# Patient Record
Sex: MALE | Race: WHITE | NOT HISPANIC OR LATINO | Employment: UNEMPLOYED | URBAN - METROPOLITAN AREA
[De-identification: names, ages, dates, MRNs, and addresses within clinical notes are randomized per-mention and may not be internally consistent; named-entity substitution may affect disease eponyms.]

---

## 2019-08-06 ENCOUNTER — OFFICE VISIT (OUTPATIENT)
Dept: ORTHOPEDIC SURGERY | Facility: CLINIC | Age: 29
End: 2019-08-06

## 2019-08-06 VITALS — HEART RATE: 69 BPM | WEIGHT: 249.34 LBS | OXYGEN SATURATION: 98 % | HEIGHT: 69 IN | BODY MASS INDEX: 36.93 KG/M2

## 2019-08-06 DIAGNOSIS — S42.001A CLOSED DISPLACED FRACTURE OF RIGHT CLAVICLE, UNSPECIFIED PART OF CLAVICLE, INITIAL ENCOUNTER: Primary | ICD-10-CM

## 2019-08-06 PROCEDURE — 99203 OFFICE O/P NEW LOW 30 MIN: CPT | Performed by: PHYSICIAN ASSISTANT

## 2019-08-06 RX ORDER — OMEPRAZOLE 40 MG/1
40 CAPSULE, DELAYED RELEASE ORAL DAILY
COMMUNITY

## 2019-08-06 RX ORDER — LISINOPRIL 20 MG/1
20 TABLET ORAL DAILY
COMMUNITY

## 2019-08-06 RX ORDER — IBUPROFEN 200 MG
200 TABLET ORAL EVERY 6 HOURS PRN
COMMUNITY
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
## 2023-06-14 ENCOUNTER — OFFICE VISIT (OUTPATIENT)
Dept: CARDIOLOGY | Facility: CLINIC | Age: 33
End: 2023-06-14
Payer: COMMERCIAL

## 2023-06-14 VITALS
HEIGHT: 68 IN | OXYGEN SATURATION: 96 % | HEART RATE: 80 BPM | BODY MASS INDEX: 40.77 KG/M2 | WEIGHT: 269 LBS | SYSTOLIC BLOOD PRESSURE: 132 MMHG | DIASTOLIC BLOOD PRESSURE: 70 MMHG

## 2023-06-14 DIAGNOSIS — G47.33 OSA (OBSTRUCTIVE SLEEP APNEA): Primary | ICD-10-CM

## 2023-06-14 DIAGNOSIS — I10 PRIMARY HYPERTENSION: ICD-10-CM

## 2023-06-14 DIAGNOSIS — G47.10 HYPERSOMNIA, UNSPECIFIED: ICD-10-CM

## 2023-06-14 RX ORDER — LOSARTAN POTASSIUM 50 MG/1
50 TABLET ORAL
COMMUNITY

## 2023-06-14 NOTE — ASSESSMENT & PLAN NOTE
He has been told that he had sleep apnea in the past.  His last home sleep study was about 3 years ago.  He has tried to use CPAP therapy but he was pulling the mask off for unknown reasons.  He returned the PAP device to the [facility] and does not have a Pap now.    Plan Home sleep test for reevaluation so that we can restart treatment.    He reports that he is very willing to restart PAP therapy if his home sleep test is positive.

## 2023-06-14 NOTE — PATIENT INSTRUCTIONS
Sleep Apnea  Sleep apnea is a condition in which breathing pauses or becomes shallow during sleep. People with sleep apnea usually snore loudly. They may have times when they gasp and stop breathing for 10 seconds or more during sleep. This may happen many times during the night.  Sleep apnea disrupts your sleep and keeps your body from getting the rest that it needs. This condition can increase your risk of certain health problems, including:  Heart attack.  Stroke.  Obesity.  Type 2 diabetes.  Heart failure.  Irregular heartbeat.  High blood pressure.  The goal of treatment is to help you breathe normally again.  What are the causes?  A normal airway compared to a blocked airway.      The most common cause of sleep apnea is a collapsed or blocked airway.  There are three kinds of sleep apnea:  Obstructive sleep apnea. This kind is caused by a blocked or collapsed airway.  Central sleep apnea. This kind happens when the part of the brain that controls breathing does not send the correct signals to the muscles that control breathing.  Mixed sleep apnea. This is a combination of obstructive and central sleep apnea.  What increases the risk?  You are more likely to develop this condition if you:  Are overweight.  Smoke.  Have a smaller than normal airway.  Are older.  Are male.  Drink alcohol.  Take sedatives or tranquilizers.  Have a family history of sleep apnea.  Have a tongue or tonsils that are larger than normal.  What are the signs or symptoms?  Symptoms of this condition include:  Trouble staying asleep.  Loud snoring.  Morning headaches.  Waking up gasping.  Dry mouth or sore throat in the morning.  Daytime sleepiness and tiredness.  If you have daytime fatigue because of sleep apnea, you may be more likely to have:  Trouble concentrating.  Forgetfulness.  Irritability or mood swings.  Personality changes.  Feelings of depression.  Sexual dysfunction. This may include loss of interest if you are female, or  erectile dysfunction if you are male.  How is this diagnosed?  This condition may be diagnosed with:  A medical history.  A physical exam.  A series of tests that are done while you are sleeping (sleep study). These tests are usually done in a sleep lab, but they may also be done at home.  How is this treated?  A person using a CPAP device.      Treatment for this condition aims to restore normal breathing and to ease symptoms during sleep. It may involve managing health issues that can affect breathing, such as high blood pressure or obesity. Treatment may include:  Sleeping on your side.  Using a decongestant if you have nasal congestion.  Avoiding the use of depressants, including alcohol, sedatives, and narcotics.  Losing weight if you are overweight.  Making changes to your diet.  Quitting smoking.  Using a device to open your airway while you sleep, such as:  An oral appliance. This is a custom-made mouthpiece that shifts your lower jaw forward.  A continuous positive airway pressure (CPAP) device. This device blows air through a mask when you breathe out (exhale).  A nasal expiratory positive airway pressure (EPAP) device. This device has valves that you put into each nostril.  A bi-level positive airway pressure (BIPAP) device. This device blows air through a mask when you breathe in (inhale) and breathe out (exhale).  Having surgery if other treatments do not work. During surgery, excess tissue is removed to create a wider airway.  Follow these instructions at home:  Lifestyle  Make any lifestyle changes that your health care provider recommends.  Eat a healthy, well-balanced diet.  Take steps to lose weight if you are overweight.  Avoid using depressants, including alcohol, sedatives, and narcotics.  Do not use any products that contain nicotine or tobacco. These products include cigarettes, chewing tobacco, and vaping devices, such as e-cigarettes. If you need help quitting, ask your health care  provider.  General instructions  Take over-the-counter and prescription medicines only as told by your health care provider.  If you were given a device to open your airway while you sleep, use it only as told by your health care provider.  If you are having surgery, make sure to tell your health care provider you have sleep apnea. You may need to bring your device with you.  Keep all follow-up visits. This is important.  Contact a health care provider if:  The device that you received to open your airway during sleep is uncomfortable or does not seem to be working.  Your symptoms do not improve.  Your symptoms get worse.  Get help right away if:  You develop:  Chest pain.  Shortness of breath.  Discomfort in your back, arms, or stomach.  You have:  Trouble speaking.  Weakness on one side of your body.  Drooping in your face.  These symptoms may represent a serious problem that is an emergency. Do not wait to see if the symptoms will go away. Get medical help right away. Call your local emergency services (911 in the [place] Do not drive yourself to the hospital.  Summary  Sleep apnea is a condition in which breathing pauses or becomes shallow during sleep.  The most common cause is a collapsed or blocked airway.  The goal of treatment is to restore normal breathing and to ease symptoms during sleep.  This information is not intended to replace advice given to you by your health care provider. Make sure you discuss any questions you have with your health care provider.  Document Revised: 07/27/2022 Document Reviewed: 11/26/2021  Room Patient Education © 2022 [employer]    Quality Sleep Information, Adult  Quality sleep is important for your mental and physical health. It also improves your quality of life. Quality sleep means you:  Are asleep for most of the time you are in bed.  Fall asleep within 30 minutes.  Wake up no more than once a night.   Are awake for no longer than 20 minutes if you do wake up  during the night.  Most adults need 7-8 hours of quality sleep each night.  How can poor sleep affect me?  If you do not get enough quality sleep, you may have:  Mood swings.  Daytime sleepiness.  Confusion.  Decreased reaction time.  Sleep disorders, such as insomnia and sleep apnea.  Difficulty with:  Solving problems.  Coping with stress.  Paying attention.  These issues may affect your performance and productivity at work, school, and at home. Lack of sleep may also put you at higher risk for accidents, suicide, and risky behaviors.  If you do not get quality sleep you may also be at higher risk for several health problems, including:  Infections.  Type 2 diabetes.  Heart disease.  High blood pressure.  Obesity.  Worsening of long-term conditions, like arthritis, kidney disease, depression, Parkinson's disease, and epilepsy.  What actions can I take to get more quality sleep?  A sign showing that a person should not smoke.         A sign showing that a person should not drink alcohol.      Stick to a sleep schedule. Go to sleep and wake up at about the same time each day. Do not try to sleep less on weekdays and make up for lost sleep on weekends. This does not work.  Try to get about 30 minutes of exercise on most days. Do not exercise 2-3 hours before going to bed.  Limit naps during the day to 30 minutes or less.  Do not use any products that contain nicotine or tobacco, such as cigarettes or e-cigarettes. If you need help quitting, ask your health care provider.  Do not drink caffeinated beverages for at least 8 hours before going to bed. Coffee, tea, and some sodas contain caffeine.  Do not drink alcohol close to bedtime.  Do not eat large meals close to bedtime.  Do not take naps in the late afternoon.  Try to get at least 30 minutes of sunlight every day. Morning sunlight is best.  Make time to relax before bed. Reading, listening to music, or taking a hot bath promotes quality sleep.  Make your bedroom a  place that promotes quality sleep. Keep your bedroom dark, quiet, and at a comfortable room temperature. Make sure your bed is comfortable. Take out sleep distractions like TV, a computer, smartphone, and bright lights.  If you are lying awake in bed for longer than 20 minutes, get up and do a relaxing activity until you feel sleepy.  Work with your health care provider to treat medical conditions that may affect sleeping, such as:  Nasal obstruction.  Snoring.  Sleep apnea and other sleep disorders.  Talk to your health care provider if you think any of your prescription medicines may cause you to have difficulty falling or staying asleep.  If you have sleep problems, talk with a sleep consultant. If you think you have a sleep disorder, talk with your health care provider about getting evaluated by a specialist.  Where to find more information  National Sleep Foundation website: https://sleepfoundation.org  National Heart, Lung, and [place] (NHLBI): www.nhlbi.nih.gov/files/docs/public/sleep/healthy_sleep.pdf  Centers for Disease Control and Prevention (CDC): www.cdc.gov/sleep/index.html  Contact a health care provider if you:  Have trouble getting to sleep or staying asleep.  Often wake up very early in the morning and cannot get back to sleep.  Have daytime sleepiness.  Have daytime sleep attacks of suddenly falling asleep and sudden muscle weakness (narcolepsy).  Have a tingling sensation in your legs with a strong urge to move your legs (restless legs syndrome).  Stop breathing briefly during sleep (sleep apnea).  Think you have a sleep disorder or are taking a medicine that is affecting your quality of sleep.  Summary  Most adults need 7-8 hours of quality sleep each night.  Getting enough quality sleep is an important part of health and well-being.  Make your bedroom a place that promotes quality sleep and avoid things that may cause you to have poor sleep, such as alcohol, caffeine, smoking, and large  meals.  Talk to your health care provider if you have trouble falling asleep or staying asleep.  This information is not intended to replace advice given to you by your health care provider. Make sure you discuss any questions you have with your health care provider.  Document Revised: 10/17/2022 Document Reviewed: 10/17/2022  Elsevier Patient Education © 2022 [employer]

## 2023-06-14 NOTE — ASSESSMENT & PLAN NOTE
Pressure today 132/70.  This is well controlled.  He is on losartan and he is encouraged to continue.    Untreated sleep apnea may potentiate hypertension so we plan to get him retested and restart PAP therapy if indicated.

## 2023-06-14 NOTE — ASSESSMENT & PLAN NOTE
He has excessive daytime sleepiness.  He sleeps about 9 hours at night but feels like he is not unrestored.  He is snoring has witnessed apneas and moving frequently in his sleep.    We will reevaluate for sleep apnea so that we can restart treatment.  We will reevaluate symptoms after treatment.

## 2023-06-14 NOTE — PROGRESS NOTES
New Sleep Consult     Date:   2023  Name: Jacobo Kauffman  :   1990  PCP: Jennifer Alarcon MD    Chief Complaint   Patient presents with    [facility]     Sleep Evaluation       Subjective     History of Present Illness  Jacobo Kauffman is a 33 y.o. male who presents today for new patient evaluation.  He has been referred by his primary care provider for further evaluation of his history of sleep apnea but no Pap device.  He is accompanied by his very helpful wife Lashonda at today's visit.      She reports that he snores very loudly, snores in all positions, that he moves frequently in his sleep.  She reports witnessed apneas and that he will gasp for air in his sleep.  She reports that he acts out his dreams at times although this is only a few times per year.    He reports that he was told from childhood that he was a sleepwalker.  He reports that he can sleep a full night sleep of 8 or 9 hours and still feels sleepy when he wakes up and want to go back to sleep.  He reports that he normally does not take a nap because he does not sleep in the daylight.  He reports if he sits still or lays quietly in the bed then he will fall asleep.    He reports that he did try PAP therapy in the past but unfortunately due to his frequent moving he would pull the mask off during sleep and he did not even know why he pulled it off.  He was not using his PAP more than 4 hours and he return the PAP device to the DME company.    He has coexisting GERD, hypertension and obesity.        No specialty comments available.    Further details are as follows:    Neck Measurement: 16.5 inches    [place] Scale is (out of 24): Total score: 7     Estimated average amount of sleep per night: 9  Number of times he wakes up at night: 0  Difficulty falling back asleep: no  It usually takes 5 minutes to go to sleep.  He feels sleepy upon waking up: yes  Rotating or night shift work: no    Drowsiness/Sleepiness:  He exhibits the  following:  excessive daytime sleepiness  excessive daytime fatigue  falls asleep watching TV  falls asleep during times of the day when he is quiet    Snoring/Breathing:  He exhibits the following:  loud snoring, snores in all sleep positions, quits breathing at night, awakens gasping for breath, and morning headaches    Head Injury:  He exhibits the following:  No    Reflux:  He describes the following:  wakes up at night with a sour taste or burning sensation in chest  takes medication for reflux  eats 3 meals daily     Narcolepsy:  He exhibits the following:  none    RLS/PLMs:  He describes the following:  none    Insomnia:  He describes the following:  None     Parasomnia:  He exhibits the following:  sleep talks  acts out dreams  grinds teeth  He had been a sleep walker in the past, as a child, as a teen ager and as a young adult with his last episode about 10 years ago.     Weight:       06/14/23  1542   Weight: 122 kg (269 lb)      Weight change in the last year:  gain: 30 lbs    The patient's relevant past medical, surgical, family, and social history reviewed and updated in Epic as appropriate.    Past Medical History:   Diagnosis Date    Acid reflux     Hypertension      History reviewed. No pertinent surgical history.    Allergies   Allergen Reactions    Lisinopril Cough     Prior to Admission medications    Medication Sig Start Date End Date Taking? Authorizing Provider   losartan (COZAAR) 50 MG tablet Take 1 tablet by mouth.   Yes Angelica Weinstein MD   omeprazole (priLOSEC) 40 MG capsule Take 1 capsule by mouth Daily.   Yes Angelica Weinstein MD   ibuprofen (ADVIL,MOTRIN) 200 MG tablet Take 200 mg by mouth Every 6 (Six) Hours As Needed for Mild Pain .  6/14/23  Angelica Weinstein MD   lisinopril (PRINIVIL,ZESTRIL) 20 MG tablet Take 20 mg by mouth Daily.  6/14/23  Angelica Weinstein MD     Family History   Problem Relation Age of Onset    Heart disease Mother     Hypertension Mother      "Diabetes Mother     Hypertension Father        Objective     Vital Signs:  /70 (BP Location: Left arm, Patient Position: Sitting)   Pulse 80   Ht 172.7 cm (68\")   Wt 122 kg (269 lb)   SpO2 96%   BMI 40.90 kg/m²     Class 3 Severe Obesity (BMI >=40). Obesity-related health conditions include the following: obstructive sleep apnea and GERD. Obesity is worsening. BMI is is above average; BMI management plan is completed. We discussed portion control and increasing exercise.        Physical Exam  Constitutional:       Appearance: Normal appearance. He is well-developed.   HENT:      Head: Normocephalic and atraumatic.      Nose: Nose normal.      Mouth/Throat:      Mouth: Mucous membranes are moist.   Eyes:      General: No scleral icterus.     Pupils: Pupils are equal, round, and reactive to light.   Neck:      Vascular: No carotid bruit.   Cardiovascular:      Rate and Rhythm: Normal rate and regular rhythm.      Pulses: Normal pulses.           Radial pulses are 2+ on the right side and 2+ on the left side.        Dorsalis pedis pulses are 2+ on the right side and 2+ on the left side.        Posterior tibial pulses are 2+ on the right side and 2+ on the left side.      Heart sounds: Normal heart sounds. No murmur heard.  Pulmonary:      Effort: Pulmonary effort is normal.      Breath sounds: Normal breath sounds. No wheezing or rhonchi.   Abdominal:      General: Bowel sounds are normal.   Musculoskeletal:         General: Normal range of motion.      Right lower leg: No edema.      Left lower leg: No edema.   Skin:     General: Skin is warm and dry.      Capillary Refill: Capillary refill takes less than 2 seconds.      Coloration: Skin is not cyanotic.      Nails: There is no clubbing.   Neurological:      Mental Status: He is alert and oriented to person, place, and time.      Motor: No weakness.      Gait: Gait normal.   Psychiatric:         Mood and Affect: Mood normal.         Behavior: Behavior " normal. Behavior is cooperative.         Thought Content: Thought content normal.         Cognition and Memory: Memory normal.           Labs reviewed: 1/18/2023 hemoglobin A1c, CBC, CMP, lipids and referral note from PCP from May 30, 2023.          Assessment and Plan     Jacobo Kauffman is a 33 y.o. male who presents for further evaluation of excessive daytime sleepiness and fatigue, nonrestorative sleep, and concerns for sleep disordered breathing and obstructive sleep apnea.  We will obtain testing for further evaluation.  The patient will return for follow-up and recommendations after test.  I have discussed weight loss as it pertains to obstructive sleep apnea.    Diagnoses and all orders for this visit:    1. GEORGE (obstructive sleep apnea) (Primary)  Assessment & Plan:  He has been told that he had sleep apnea in the past.  His last home sleep study was about 3 years ago.  He has tried to use CPAP therapy but he was pulling the mask off for unknown reasons.  He returned the PAP device to the [facility] and does not have a Pap now.    Plan Home sleep test for reevaluation so that we can restart treatment.    He reports that he is very willing to restart PAP therapy if his home sleep test is positive.    Orders:  -     Home Sleep Study; Future    2. Hypersomnia, unspecified  Assessment & Plan:  He has excessive daytime sleepiness.  He sleeps about 9 hours at night but feels like he is not unrestored.  He is snoring has witnessed apneas and moving frequently in his sleep.    We will reevaluate for sleep apnea so that we can restart treatment.  We will reevaluate symptoms after treatment.    Orders:  -     Home Sleep Study; Future    3. Primary hypertension  Assessment & Plan:  Pressure today 132/70.  This is well controlled.  He is on losartan and he is encouraged to continue.    Untreated sleep apnea may potentiate hypertension so we plan to get him retested and restart PAP therapy if indicated.          I discussed the  consequences of uncontrolled sleep apnea including hypertension, heart disease, diabetes, stroke, and dementia. I further discussed sleep apnea therapeutic options including CPAP, Weight loss, Oral dental appliance, and surgery.    Report if any new/changing symptoms immediately, Sleep risks reviewed (driving, medical, sleep death, sedating agents), and Sleep hygiene discussed         Follow Up  Return in about 3 months (around 9/14/2023) for GEORGE.  Patient was given instructions and counseling regarding his condition or for health maintenance advice. Please see specific information pulled into the AVS if appropriate.

## 2023-10-03 ENCOUNTER — OFFICE VISIT (OUTPATIENT)
Dept: CARDIOLOGY | Facility: CLINIC | Age: 33
End: 2023-10-03
Payer: COMMERCIAL

## 2023-10-03 VITALS
DIASTOLIC BLOOD PRESSURE: 86 MMHG | HEART RATE: 83 BPM | OXYGEN SATURATION: 98 % | SYSTOLIC BLOOD PRESSURE: 136 MMHG | HEIGHT: 68 IN | BODY MASS INDEX: 41.98 KG/M2 | WEIGHT: 277 LBS

## 2023-10-03 DIAGNOSIS — I10 PRIMARY HYPERTENSION: ICD-10-CM

## 2023-10-03 DIAGNOSIS — G47.33 OSA (OBSTRUCTIVE SLEEP APNEA): Primary | ICD-10-CM

## 2023-10-03 DIAGNOSIS — G47.10 HYPERSOMNIA, UNSPECIFIED: ICD-10-CM

## 2023-10-03 NOTE — ASSESSMENT & PLAN NOTE
Baseline AHI is 21 increases to 39 in supine position.  Sleep apnea is moderate to severe.    Discussion today included diagnosis of sleep apnea, risk of untreated sleep apnea, options for treatment of sleep apnea including but not limited to side sleep position, head of bed elevated, weight loss, referral to ENT for possible surgical options including inspire device.  Benefits of CPAP therapy.    He is on CPAP therapy.  He is attempting to adjust to CPAP therapy.  He continues to pull mask off during sleep and he is unsure why.    Plan in lab titration study for further evaluation and treatment of moderate to severe sleep apnea.    Plan mask adjustment at [facility] to consider a larger size or a different style mask.    Plan for wife to set up for our alarm, check to see if CPAP mask is in use and if not return to CPAP usage.    Plan increase all hours of sleep to include CPAP therapy.

## 2023-10-03 NOTE — ASSESSMENT & PLAN NOTE
Blood pressure today is well controlled.  He is on blood pressure medication losartan and he is encouraged to continue medication.    We discussed today that untreated sleep apnea may potentiate hypertension so he is encouraged to continue PAP therapy.

## 2023-10-03 NOTE — ASSESSMENT & PLAN NOTE
He reports that his excessive daytime sleepiness has improved with CPAP use.    He feels like when he sleeps with CPAP that his sleep is more rested.  He reports that his headaches have improved.    His wife reports that he is snoring has improved.    Plan: In lab titration study for further evaluation and treatment of moderate to severe sleep apnea.  Plan follow-up after study.   negative...

## 2023-10-03 NOTE — PATIENT INSTRUCTIONS
Sleep Apnea  Sleep apnea is a condition in which breathing pauses or becomes shallow during sleep. People with sleep apnea usually snore loudly. They may have times when they gasp and stop breathing for 10 seconds or more during sleep. This may happen many times during the night.  Sleep apnea disrupts your sleep and keeps your body from getting the rest that it needs. This condition can increase your risk of certain health problems, including:  Heart attack.  Stroke.  Obesity.  Type 2 diabetes.  Heart failure.  Irregular heartbeat.  High blood pressure.  The goal of treatment is to help you breathe normally again.  What are the causes?  A normal airway compared to a blocked airway.      The most common cause of sleep apnea is a collapsed or blocked airway.  There are three kinds of sleep apnea:  Obstructive sleep apnea. This kind is caused by a blocked or collapsed airway.  Central sleep apnea. This kind happens when the part of the brain that controls breathing does not send the correct signals to the muscles that control breathing.  Mixed sleep apnea. This is a combination of obstructive and central sleep apnea.  What increases the risk?  You are more likely to develop this condition if you:  Are overweight.  Smoke.  Have a smaller than normal airway.  Are older.  Are male.  Drink alcohol.  Take sedatives or tranquilizers.  Have a family history of sleep apnea.  Have a tongue or tonsils that are larger than normal.  What are the signs or symptoms?  Symptoms of this condition include:  Trouble staying asleep.  Loud snoring.  Morning headaches.  Waking up gasping.  Dry mouth or sore throat in the morning.  Daytime sleepiness and tiredness.  If you have daytime fatigue because of sleep apnea, you may be more likely to have:  Trouble concentrating.  Forgetfulness.  Irritability or mood swings.  Personality changes.  Feelings of depression.  Sexual dysfunction. This may include loss of interest if you are female, or  erectile dysfunction if you are male.  How is this diagnosed?  This condition may be diagnosed with:  A medical history.  A physical exam.  A series of tests that are done while you are sleeping (sleep study). These tests are usually done in a sleep lab, but they may also be done at home.  How is this treated?  A person using a CPAP device.      Treatment for this condition aims to restore normal breathing and to ease symptoms during sleep. It may involve managing health issues that can affect breathing, such as high blood pressure or obesity. Treatment may include:  Sleeping on your side.  Using a decongestant if you have nasal congestion.  Avoiding the use of depressants, including alcohol, sedatives, and narcotics.  Losing weight if you are overweight.  Making changes to your diet.  Quitting smoking.  Using a device to open your airway while you sleep, such as:  An oral appliance. This is a custom-made mouthpiece that shifts your lower jaw forward.  A continuous positive airway pressure (CPAP) device. This device blows air through a mask when you breathe out (exhale).  A nasal expiratory positive airway pressure (EPAP) device. This device has valves that you put into each nostril.  A bi-level positive airway pressure (BIPAP) device. This device blows air through a mask when you breathe in (inhale) and breathe out (exhale).  Having surgery if other treatments do not work. During surgery, excess tissue is removed to create a wider airway.  Follow these instructions at home:  Lifestyle  Make any lifestyle changes that your health care provider recommends.  Eat a healthy, well-balanced diet.  Take steps to lose weight if you are overweight.  Avoid using depressants, including alcohol, sedatives, and narcotics.  Do not use any products that contain nicotine or tobacco. These products include cigarettes, chewing tobacco, and vaping devices, such as e-cigarettes. If you need help quitting, ask your health care  provider.  General instructions  Take over-the-counter and prescription medicines only as told by your health care provider.  If you were given a device to open your airway while you sleep, use it only as told by your health care provider.  If you are having surgery, make sure to tell your health care provider you have sleep apnea. You may need to bring your device with you.  Keep all follow-up visits. This is important.  Contact a health care provider if:  The device that you received to open your airway during sleep is uncomfortable or does not seem to be working.  Your symptoms do not improve.  Your symptoms get worse.  Get help right away if:  You develop:  Chest pain.  Shortness of breath.  Discomfort in your back, arms, or stomach.  You have:  Trouble speaking.  Weakness on one side of your body.  Drooping in your face.  These symptoms may represent a serious problem that is an emergency. Do not wait to see if the symptoms will go away. Get medical help right away. Call your local emergency services (911 in the [place] Do not drive yourself to the hospital.  Summary  Sleep apnea is a condition in which breathing pauses or becomes shallow during sleep.  The most common cause is a collapsed or blocked airway.  The goal of treatment is to restore normal breathing and to ease symptoms during sleep.  This information is not intended to replace advice given to you by your health care provider. Make sure you discuss any questions you have with your health care provider.  Document Revised: 07/27/2022 Document Reviewed: 11/26/2021  [employer] Patient Education © 2022 [employer] Inc[employer]    Quality Sleep Information, Adult  Quality sleep is important for your mental and physical health. It also improves your quality of life. Quality sleep means you:  Are asleep for most of the time you are in bed.  Fall asleep within 30 minutes.  Wake up no more than once a night.   Are awake for no longer than 20 minutes if you do wake up  during the night.  Most adults need 7-8 hours of quality sleep each night.  How can poor sleep affect me?  If you do not get enough quality sleep, you may have:  Mood swings.  Daytime sleepiness.  Confusion.  Decreased reaction time.  Sleep disorders, such as insomnia and sleep apnea.  Difficulty with:  Solving problems.  Coping with stress.  Paying attention.  These issues may affect your performance and productivity at work, school, and at home. Lack of sleep may also put you at higher risk for accidents, suicide, and risky behaviors.  If you do not get quality sleep you may also be at higher risk for several health problems, including:  Infections.  Type 2 diabetes.  Heart disease.  High blood pressure.  Obesity.  Worsening of long-term conditions, like arthritis, kidney disease, depression, Parkinson's disease, and epilepsy.  What actions can I take to get more quality sleep?  A sign showing that a person should not smoke.         A sign showing that a person should not drink alcohol.      Stick to a sleep schedule. Go to sleep and wake up at about the same time each day. Do not try to sleep less on weekdays and make up for lost sleep on weekends. This does not work.  Try to get about 30 minutes of exercise on most days. Do not exercise 2-3 hours before going to bed.  Limit naps during the day to 30 minutes or less.  Do not use any products that contain nicotine or tobacco, such as cigarettes or e-cigarettes. If you need help quitting, ask your health care provider.  Do not drink caffeinated beverages for at least 8 hours before going to bed. Coffee, tea, and some sodas contain caffeine.  Do not drink alcohol close to bedtime.  Do not eat large meals close to bedtime.  Do not take naps in the late afternoon.  Try to get at least 30 minutes of sunlight every day. Morning sunlight is best.  Make time to relax before bed. Reading, listening to music, or taking a hot bath promotes quality sleep.  Make your bedroom a  place that promotes quality sleep. Keep your bedroom dark, quiet, and at a comfortable room temperature. Make sure your bed is comfortable. Take out sleep distractions like TV, a computer, smartphone, and bright lights.  If you are lying awake in bed for longer than 20 minutes, get up and do a relaxing activity until you feel sleepy.  Work with your health care provider to treat medical conditions that may affect sleeping, such as:  Nasal obstruction.  Snoring.  Sleep apnea and other sleep disorders.  Talk to your health care provider if you think any of your prescription medicines may cause you to have difficulty falling or staying asleep.  If you have sleep problems, talk with a sleep consultant. If you think you have a sleep disorder, talk with your health care provider about getting evaluated by a specialist.  Where to find more information  National Sleep Foundation website: https://sleepfoundation.org  National Heart, Lung, and Blood [place] (NHLBI): www.nhlbi.nih.gov/files/docs/public/sleep/healthy_sleep.pdf  Centers for Disease Control and Prevention (CDC): www.cdc.gov/sleep/index.html  Contact a health care provider if you:  Have trouble getting to sleep or staying asleep.  Often wake up very early in the morning and cannot get back to sleep.  Have daytime sleepiness.  Have daytime sleep attacks of suddenly falling asleep and sudden muscle weakness (narcolepsy).  Have a tingling sensation in your legs with a strong urge to move your legs (restless legs syndrome).  Stop breathing briefly during sleep (sleep apnea).  Think you have a sleep disorder or are taking a medicine that is affecting your quality of sleep.  Summary  Most adults need 7-8 hours of quality sleep each night.  Getting enough quality sleep is an important part of health and well-being.  Make your bedroom a place that promotes quality sleep and avoid things that may cause you to have poor sleep, such as alcohol, caffeine, smoking, and large  meals.  Talk to your health care provider if you have trouble falling asleep or staying asleep.  This information is not intended to replace advice given to you by your health care provider. Make sure you discuss any questions you have with your health care provider.  Document Revised: 10/17/2022 Document Reviewed: 10/17/2022  [employer]vier Patient Education © 2022 [employer]

## 2023-10-03 NOTE — PROGRESS NOTES
Follow-Up Sleep Consult     Date:   10/03/2023  Name: Jacobo Kauffman  :   1990  PCP: Jennifer Alarcon MD    Chief Complaint   Patient presents with   • Sleep Apnea       Subjective     History of Present Illness  Jacobo Kauffman is a 33 y.o. male who presents today for follow-up on GEORGE.  He has a known history of sleep apnea and was diagnosed about 4 years ago.  He did not tolerate PAP therapy and did not have a CPAP.    At his last visit he was ordered a home sleep test and this has been completed.  Home sleep test results was called to the patient and he was asked to start CPAP therapy.    At today's visit we have reviewed the home sleep test in detail with the patient and his very helpful wife Lashonda.    He has started on CPAP therapy.  He has been on CPAP therapy now more than 30 days but less than 90 days.  Approximately 2-1/2 months.      He reports that his airflow is comfortable.  He reports that his mask is a good fit but it is possible that he needs a larger size as he has a lot of air leak.  He reports that he pulls the mask off during sleep and he is unsure why.  He reports he wakes up and his mask is off.  He reports that CPAP mask leaks if he sleeps on his side so he has had more back sleep since starting CPAP therapy.  He does not prefer to sleep on his back he prefers side sleep.    Sleep history:    GEORGE baseline AHI 21/39 supine on HST 2023    Current GEORGE therapy auto CPAP 6 to 16 cm    Coexisting condition hypertension and obesity.      Current mask used is fullface mask    Device Functioning Well: Yes  Mask Fit Comfortable: No, Air Leak  and Wrong Size   Air Flow Comfortable: Yes  DME Helpful for Supplies: Yes  Sleep is rested: Yes already has been noticing an improvement. Snoring is better. Headaches have been improving. Sleeps a bit more rested. He is still tired and low energy and snoring but is seeing some improvement.         Device Download:           The patient's relevant  "past medical, surgical, family, and social history reviewed and updated in Epic as appropriate.    Past Medical History:   Diagnosis Date   • Acid reflux    • Hypertension      History reviewed. No pertinent surgical history.    Allergies   Allergen Reactions   • Lisinopril Cough     Prior to Admission medications    Medication Sig Start Date End Date Taking? Authorizing Provider   losartan (COZAAR) 50 MG tablet Take 1 tablet by mouth.   Yes Provider, MD Angelica   omeprazole (priLOSEC) 40 MG capsule Take 1 capsule by mouth Daily.   Yes Provider, Angelica, MD     Family History   Problem Relation Age of Onset   • Heart disease Mother    • Hypertension Mother    • Diabetes Mother    • Hypertension Father        Objective     Vital Signs:  /86   Pulse 83   Ht 172.7 cm (68\")   Wt 126 kg (277 lb)   SpO2 98%   BMI 42.12 kg/m²              Physical Exam  HENT:      Head: Normocephalic.      Nose: Nose normal.      Mouth/Throat:      Mouth: Mucous membranes are moist.   Pulmonary:      Effort: Pulmonary effort is normal.   Skin:     General: Skin is warm and dry.   Neurological:      Mental Status: He is alert and oriented to person, place, and time.   Psychiatric:         Mood and Affect: Mood normal.         Behavior: Behavior normal.         Thought Content: Thought content normal.       The following data was reviewed by: BROCK Odell on 10/03/2023:    PAP download reviewed: 9/3 through 10/2/2023.  30-day download.  I have reviewed and interpreted the data on the download.  and HST on 6/28/2023.         Assessment and Plan     Diagnoses and all orders for this visit:    1. GEORGE (obstructive sleep apnea) (Primary)  Assessment & Plan:  Baseline AHI is 21 increases to 39 in supine position.  Sleep apnea is moderate to severe.    Discussion today included diagnosis of sleep apnea, risk of untreated sleep apnea, options for treatment of sleep apnea including but not limited to side sleep " position, head of bed elevated, weight loss, referral to ENT for possible surgical options including inspire device.  Benefits of CPAP therapy.    He is on CPAP therapy.  He is attempting to adjust to CPAP therapy.  He continues to pull mask off during sleep and he is unsure why.    Plan in lab titration study for further evaluation and treatment of moderate to severe sleep apnea.    Plan mask adjustment at [facility] to consider a larger size or a different style mask.    Plan for wife to set up for our alarm, check to see if CPAP mask is in use and if not return to CPAP usage.    Plan increase all hours of sleep to include CPAP therapy.    Orders:  -     PAP Therapy  -     Titration; Future    2. Hypersomnia, unspecified  Assessment & Plan:  He reports that his excessive daytime sleepiness has improved with CPAP use.    He feels like when he sleeps with CPAP that his sleep is more rested.  He reports that his headaches have improved.    His wife reports that he is snoring has improved.    Plan: In lab titration study for further evaluation and treatment of moderate to severe sleep apnea.  Plan follow-up after study.      3. Primary hypertension  Assessment & Plan:  Blood pressure today is well controlled.  He is on blood pressure medication losartan and he is encouraged to continue medication.    We discussed today that untreated sleep apnea may potentiate hypertension so he is encouraged to continue PAP therapy.                   Follow Up  Return in about 2 months (around 12/3/2023) for GEORGE/Titration study .  Patient was given instructions and counseling regarding his condition or for health maintenance advice. Please see specific information pulled into the AVS if appropriate.

## 2023-10-19 DIAGNOSIS — G47.33 OSA (OBSTRUCTIVE SLEEP APNEA): ICD-10-CM

## 2023-10-19 DIAGNOSIS — G47.33 OSA (OBSTRUCTIVE SLEEP APNEA): Primary | ICD-10-CM

## 2023-12-05 ENCOUNTER — OFFICE VISIT (OUTPATIENT)
Dept: CARDIOLOGY | Facility: CLINIC | Age: 33
End: 2023-12-05
Payer: COMMERCIAL

## 2023-12-05 VITALS
HEIGHT: 68 IN | HEART RATE: 84 BPM | DIASTOLIC BLOOD PRESSURE: 88 MMHG | OXYGEN SATURATION: 98 % | SYSTOLIC BLOOD PRESSURE: 136 MMHG | WEIGHT: 281 LBS | BODY MASS INDEX: 42.59 KG/M2

## 2023-12-05 DIAGNOSIS — G47.33 OSA (OBSTRUCTIVE SLEEP APNEA): Primary | ICD-10-CM

## 2023-12-05 DIAGNOSIS — G89.29 CHRONIC NONINTRACTABLE HEADACHE, UNSPECIFIED HEADACHE TYPE: ICD-10-CM

## 2023-12-05 DIAGNOSIS — R51.9 CHRONIC NONINTRACTABLE HEADACHE, UNSPECIFIED HEADACHE TYPE: ICD-10-CM

## 2023-12-05 DIAGNOSIS — I10 PRIMARY HYPERTENSION: ICD-10-CM

## 2023-12-05 PROBLEM — G47.10 HYPERSOMNIA, UNSPECIFIED: Status: RESOLVED | Noted: 2023-06-14 | Resolved: 2023-12-05

## 2023-12-05 RX ORDER — ESOMEPRAZOLE MAGNESIUM 40 MG/1
CAPSULE, DELAYED RELEASE ORAL
COMMUNITY
Start: 2023-10-18

## 2023-12-05 NOTE — ASSESSMENT & PLAN NOTE
Hypertension is improving with treatment.  Continue current treatment regimen.  Blood pressure will be reassessed in 4 weeks.    He is encouraged to not miss blood pressure medications.    He is advised to use a daily pill box.      He is advised to set a daily reminder on his phone to remind him to take his medicine.

## 2023-12-05 NOTE — ASSESSMENT & PLAN NOTE
Baseline AHI is 21 that increases to 39 in supine position.  This is moderate to severe sleep apnea.    He has completed an in lab titration study.  CPAP has failed to correct his sleep apnea.  He was titrated to BiPAP 16/12.    Today is his first visit back on new BiPAP for 31 days.  Download is reviewed good control but suboptimal compliance.  He is advised to increase BiPAP use and return for follow-up in 1 month.

## 2023-12-05 NOTE — ASSESSMENT & PLAN NOTE
He reports that he has had headaches frequently since childhood.  He has discussed with PCP.    Headaches are worse when he misses his medication.  Headaches are worse when he is exposed to cold temperatures.    He reports headaches have been better since using PAP therapy.    He is advised to continue PAP therapy and do not miss his blood pressure medicine.

## 2023-12-05 NOTE — PROGRESS NOTES
Follow-Up Sleep Consult     Date:   2023  Name: Jacobo Kauffman  :   1990  PCP: Jennifer Alarcon MD    Chief Complaint   Patient presents with    Sleep Apnea       Subjective     History of Present Illness  Jacobo Kauffman is a 33 y.o. male who presents today for follow-up on GEORGE.    At his last visit he was using CPAP but struggling with mask and airflow.  He was asked to complete an in lab titration study.  He is here today to review the titration study and to review his BiPAP download.    He was called results of titration study and was told that CPAP failed to correct his sleep apnea and he had titrated to BiPAP.    Orders were sent to DME for BiPAP.    Today's visit he is back.  He has been on BiPAP therapy for 1 month.    He reports that he has had headaches since childhood.  He reports having headaches when he forgets his BP meds.  He reports that he thinks that his headaches have improved since starting PAP therapy.    He reports he continues to struggle with mask fit.  His wife thinks it is not tight enough.  He reports he continues to struggle with keeping the mask on during sleep.  He puts the mask on but then wakes up to 3 to 4 hours and the mask is off.  His wife tried to wake him up and put his mask back on but he continues to pull it off when he is asleep.      Sleep history:    GEORGE baseline AHI 21/39 supine on HST 2023    Titration study 10/17/2023.  CPAP failed to correct GEORGE.  Titrated to BiPAP 16/12    Current GEORGE therapy auto BiPAP 18/12 PS4    Coexisting condition hypertension and obesity.      Current mask used is Air Touch F20 FFM size medium  - he needs to tighten the mask     Device Functioning Well: Yes  Mask Fit Comfortable: No, Air Leak   Air Flow Comfortable: Yes  DME Helpful for Supplies: Yes  Sleep is rested: Yes and No. He wakes up easily, not napping. But he is still pulling the mask off during sleep.         Device Download:                     The patient's  "relevant past medical, surgical, family, and social history reviewed and updated in Epic as appropriate.    Past Medical History:   Diagnosis Date    Acid reflux     Hypertension      History reviewed. No pertinent surgical history.    Allergies   Allergen Reactions    Lisinopril Cough     Prior to Admission medications    Medication Sig Start Date End Date Taking? Authorizing Provider   esomeprazole (nexIUM) 40 MG capsule  10/18/23  Yes Angelica Weinstein MD   losartan (COZAAR) 50 MG tablet Take 1 tablet by mouth.   Yes Angelica Weinstein MD   omeprazole (priLOSEC) 40 MG capsule Take 1 capsule by mouth Daily.  12/5/23  Provider, MD Angelica     Family History   Problem Relation Age of Onset    Heart disease Mother     Hypertension Mother     Diabetes Mother     Hypertension Father        Objective     Vital Signs:  /88   Pulse 84   Ht 172.7 cm (68\")   Wt 127 kg (281 lb)   SpO2 98%   BMI 42.73 kg/m²              Physical Exam  HENT:      Head: Normocephalic.      Nose: Nose normal.      Mouth/Throat:      Mouth: Mucous membranes are moist.   Pulmonary:      Effort: Pulmonary effort is normal.   Skin:     General: Skin is warm and dry.   Neurological:      Mental Status: He is alert and oriented to person, place, and time.   Psychiatric:         Mood and Affect: Mood normal.         Behavior: Behavior normal.         Thought Content: Thought content normal.         The following data was reviewed by: BROCK Odell on 12/05/2023:    PAP download reviewed: 11/5/2023 through 12/4/2023.  30-day download.  I have reviewed and interpreted the data on the download.  and titration study 11/18/20[facility] at [facility].         Assessment and Plan     Diagnoses and all orders for this visit:    1. GEORGE (obstructive sleep apnea) (Primary)  Assessment & Plan:  Baseline AHI is 21 that increases to 39 in supine position.  This is moderate to severe sleep apnea.    He has completed an in " lab titration study.  CPAP has failed to correct his sleep apnea.  He was titrated to BiPAP 16/12.    Today is his first visit back on new BiPAP for 31 days.  Download is reviewed good control but suboptimal compliance.  He is advised to increase BiPAP use and return for follow-up in 1 month.      2. Primary hypertension  Assessment & Plan:  Hypertension is improving with treatment.  Continue current treatment regimen.  Blood pressure will be reassessed in 4 weeks.    He is encouraged to not miss blood pressure medications.    He is advised to use a daily pill box.      He is advised to set a daily reminder on his phone to remind him to take his medicine.      3. Chronic nonintractable headache, unspecified headache type  Assessment & Plan:  He reports that he has had headaches frequently since childhood.  He has discussed with PCP.    Headaches are worse when he misses his medication.  Headaches are worse when he is exposed to cold temperatures.    He reports headaches have been better since using PAP therapy.    He is advised to continue PAP therapy and do not miss his blood pressure medicine.                Report if any new/changing symptoms immediately, Sleep risks reviewed (driving, medical, sleep death, sedating agents), and Increase pap therapy usage         Follow Up  Return in about 4 weeks (around 1/2/2024) for GEORGE/BP/HA .  Patient was given instructions and counseling regarding his condition or for health maintenance advice. Please see specific information pulled into the AVS if appropriate.

## 2024-01-09 ENCOUNTER — OFFICE VISIT (OUTPATIENT)
Dept: CARDIOLOGY | Facility: CLINIC | Age: 34
End: 2024-01-09
Payer: COMMERCIAL

## 2024-01-09 VITALS
BODY MASS INDEX: 42.44 KG/M2 | OXYGEN SATURATION: 97 % | WEIGHT: 280 LBS | SYSTOLIC BLOOD PRESSURE: 128 MMHG | DIASTOLIC BLOOD PRESSURE: 76 MMHG | HEIGHT: 68 IN | HEART RATE: 76 BPM

## 2024-01-09 DIAGNOSIS — G89.29 CHRONIC NONINTRACTABLE HEADACHE, UNSPECIFIED HEADACHE TYPE: ICD-10-CM

## 2024-01-09 DIAGNOSIS — G47.33 OSA (OBSTRUCTIVE SLEEP APNEA): Primary | ICD-10-CM

## 2024-01-09 DIAGNOSIS — I10 PRIMARY HYPERTENSION: ICD-10-CM

## 2024-01-09 DIAGNOSIS — R51.9 CHRONIC NONINTRACTABLE HEADACHE, UNSPECIFIED HEADACHE TYPE: ICD-10-CM

## 2024-01-09 NOTE — ASSESSMENT & PLAN NOTE
Blood pressure 128/76 at today's visit.    Blood pressure control is improving with treatment.    He is encouraged not to miss his blood pressure medications.    We will recheck at follow-up in 1 month.

## 2024-01-09 NOTE — ASSESSMENT & PLAN NOTE
He reports that he has had frequent headaches since childhood.    He notices increase in worsening headaches when he misses his blood pressure medication.  So at his last visit he was encouraged to not miss his medicines.  Today he reports that his headaches have improved and he has not missed medications and he feels like using CPAP therapy has improved his headaches.    We did discuss referral to neurology for further evaluation but patient and wife have declined at this time.

## 2024-01-09 NOTE — ASSESSMENT & PLAN NOTE
Baseline AHI is 21/39 in supine position.  This is moderate to severe sleep apnea.    We discussed other options then BiPAP including referral to ENT for surgical options or possible inspire device.  Patient declines surgical options or referral to ENT.    He is on BiPAP therapy.  Download reviewed with good control and with improving compliance.    He is benefiting from PAP therapy with plan to continue PAP therapy.    He and his wife both report that he is working hard to increase his BiPAP use.    We discussed techniques such as going to bed 1 hour earlier and improving his air leak by:  -Getting a new memory foam fullface mask  -Tightening the headgear of the fullface mask    Plan 1 month follow-up for improved compliance.

## 2024-01-09 NOTE — PROGRESS NOTES
Follow-Up Sleep Consult     Date:   2024  Name: Jacobo Kauffman  :   1990  PCP: Jennifer Alarcon MD    Chief Complaint   Patient presents with    Sleep Apnea       Subjective     History of Present Illness  Jacobo Kauffman is a 34 y.o. male who presents today for follow-up on GEORGE.  This is a short 1 month follow-up on increased compliance.    He is accompanied by his very helpful wife and they both report that he is working very hard to increase the compliance on his PAP device.  He reports that he has had a hard time adjusting to PAP use.  He does not love CPAP or BiPAP.    He reports he has a old memory foam fullface mask that is air leaking constantly.  He reports he did try to tighten the straps but this air leak is interrupting his sleep.    He also reports that he pulls the PAP mask off during sleep and is not aware that he took it off until he wakes up the next morning.    Sleep history:    GEORGE baseline AHI 21/39 supine on HST 2023    Titration study 10/17/2023.  CPAP failed to correct GEORGE.  Titrated to BiPAP 16/12    Current GEORGE therapy auto BiPAP 18/12 PS4    Coexisting condition hypertension and obesity.      Current mask used is fullface mask memory foam    Device Functioning Well: Yes  Mask Fit Comfortable: No, Air Leak  and needs a new mask  Air Flow Comfortable: Yes  DME Helpful for Supplies: No, wife reports she called DME but was told he had already received his 3 months of supplies but she reports they have not.  DME representative is contacted via phone at today's visit to help with mask and supply replacement.  Sleep is rested: No, but improving on BiPAP        Device Download:                               The patient's relevant past medical, surgical, family, and social history reviewed and updated in Epic as appropriate.    Past Medical History:   Diagnosis Date    Acid reflux     Hypertension      History reviewed. No pertinent surgical history.    Allergies   Allergen  "Reactions    Lisinopril Cough     Prior to Admission medications    Medication Sig Start Date End Date Taking? Authorizing Provider   esomeprazole (nexIUM) 40 MG capsule  10/18/23  Yes ProviderAngelica MD   losartan (COZAAR) 50 MG tablet Take 1 tablet by mouth 2 (Two) Times a Day.   Yes Provider, Angelica, MD     Family History   Problem Relation Age of Onset    Heart disease Mother     Hypertension Mother     Diabetes Mother     Hypertension Father        Objective     Vital Signs:  /76   Pulse 76   Ht 172.7 cm (68\")   Wt 127 kg (280 lb)   SpO2 97%   BMI 42.57 kg/m²              Physical Exam  HENT:      Head: Normocephalic.      Nose: Nose normal.      Mouth/Throat:      Mouth: Mucous membranes are moist.   Pulmonary:      Effort: Pulmonary effort is normal.   Skin:     General: Skin is warm and dry.   Neurological:      Mental Status: He is alert and oriented to person, place, and time.   Psychiatric:         Mood and Affect: Mood normal.         Behavior: Behavior normal.         Thought Content: Thought content normal.         The following data was reviewed by: BROCK Odell on 01/09/2024:    PAP download reviewed: 12/10/2023 through 1/8/2024.  30-day download.  I have reviewed and interpreted the data on the download.         Assessment and Plan     Diagnoses and all orders for this visit:    1. GEORGE (obstructive sleep apnea) (Primary)  Assessment & Plan:  Baseline AHI is 21/39 in supine position.  This is moderate to severe sleep apnea.    We discussed other options then BiPAP including referral to ENT for surgical options or possible inspire device.  Patient declines surgical options or referral to ENT.    He is on BiPAP therapy.  Download reviewed with good control and with improving compliance.    He is benefiting from PAP therapy with plan to continue PAP therapy.    He and his wife both report that he is working hard to increase his BiPAP use.    We discussed techniques such " as going to bed 1 hour earlier and improving his air leak by:  -Getting a new memory foam fullface mask  -Tightening the headgear of the fullface mask    Plan 1 month follow-up for improved compliance.      2. Chronic nonintractable headache, unspecified headache type  Assessment & Plan:  He reports that he has had frequent headaches since childhood.    He notices increase in worsening headaches when he misses his blood pressure medication.  So at his last visit he was encouraged to not miss his medicines.  Today he reports that his headaches have improved and he has not missed medications and he feels like using CPAP therapy has improved his headaches.    We did discuss referral to neurology for further evaluation but patient and wife have declined at this time.      3. Primary hypertension  Assessment & Plan:  Blood pressure 128/76 at today's visit.    Blood pressure control is improving with treatment.    He is encouraged not to miss his blood pressure medications.    We will recheck at follow-up in 1 month.          Report if any new/changing symptoms immediately and Increase pap therapy usage         Follow Up  Return in about 1 month (around 2/9/2024) for GEORGE.  Patient was given instructions and counseling regarding his condition or for health maintenance advice. Please see specific information pulled into the AVS if appropriate.

## 2024-02-12 ENCOUNTER — OFFICE VISIT (OUTPATIENT)
Dept: CARDIOLOGY | Facility: CLINIC | Age: 34
End: 2024-02-12
Payer: COMMERCIAL

## 2024-02-12 VITALS
DIASTOLIC BLOOD PRESSURE: 76 MMHG | BODY MASS INDEX: 42.74 KG/M2 | OXYGEN SATURATION: 98 % | SYSTOLIC BLOOD PRESSURE: 122 MMHG | HEART RATE: 86 BPM | WEIGHT: 282 LBS | HEIGHT: 68 IN

## 2024-02-12 DIAGNOSIS — G47.59 OTHER PARASOMNIA: ICD-10-CM

## 2024-02-12 DIAGNOSIS — G89.29 CHRONIC NONINTRACTABLE HEADACHE, UNSPECIFIED HEADACHE TYPE: ICD-10-CM

## 2024-02-12 DIAGNOSIS — R51.9 CHRONIC NONINTRACTABLE HEADACHE, UNSPECIFIED HEADACHE TYPE: ICD-10-CM

## 2024-02-12 DIAGNOSIS — G47.33 OSA (OBSTRUCTIVE SLEEP APNEA): Primary | ICD-10-CM

## 2024-02-12 PROCEDURE — 1160F RVW MEDS BY RX/DR IN RCRD: CPT | Performed by: NURSE PRACTITIONER

## 2024-02-12 PROCEDURE — 3074F SYST BP LT 130 MM HG: CPT | Performed by: NURSE PRACTITIONER

## 2024-02-12 PROCEDURE — 99213 OFFICE O/P EST LOW 20 MIN: CPT | Performed by: NURSE PRACTITIONER

## 2024-02-12 PROCEDURE — 3078F DIAST BP <80 MM HG: CPT | Performed by: NURSE PRACTITIONER

## 2024-02-12 PROCEDURE — 1159F MED LIST DOCD IN RCRD: CPT | Performed by: NURSE PRACTITIONER

## 2024-07-03 ENCOUNTER — OFFICE VISIT (OUTPATIENT)
Dept: CARDIOLOGY | Facility: CLINIC | Age: 34
End: 2024-07-03
Payer: COMMERCIAL

## 2024-07-03 VITALS
WEIGHT: 274 LBS | HEART RATE: 70 BPM | OXYGEN SATURATION: 96 % | SYSTOLIC BLOOD PRESSURE: 120 MMHG | DIASTOLIC BLOOD PRESSURE: 86 MMHG | BODY MASS INDEX: 41.52 KG/M2 | HEIGHT: 68 IN

## 2024-07-03 DIAGNOSIS — I10 PRIMARY HYPERTENSION: ICD-10-CM

## 2024-07-03 DIAGNOSIS — G47.33 OSA (OBSTRUCTIVE SLEEP APNEA): Primary | ICD-10-CM

## 2024-07-03 DIAGNOSIS — G47.10 HYPERSOMNIA, UNSPECIFIED: ICD-10-CM

## 2024-07-03 PROCEDURE — 99214 OFFICE O/P EST MOD 30 MIN: CPT | Performed by: NURSE PRACTITIONER

## 2024-07-03 PROCEDURE — 3074F SYST BP LT 130 MM HG: CPT | Performed by: NURSE PRACTITIONER

## 2024-07-03 PROCEDURE — 3079F DIAST BP 80-89 MM HG: CPT | Performed by: NURSE PRACTITIONER

## 2024-07-03 PROCEDURE — 1159F MED LIST DOCD IN RCRD: CPT | Performed by: NURSE PRACTITIONER

## 2024-07-03 PROCEDURE — 1160F RVW MEDS BY RX/DR IN RCRD: CPT | Performed by: NURSE PRACTITIONER

## 2024-07-03 RX ORDER — LOSARTAN POTASSIUM 100 MG/1
1 TABLET ORAL DAILY
COMMUNITY
Start: 2024-03-14

## 2024-07-03 NOTE — ASSESSMENT & PLAN NOTE
Known history of moderate to severe sleep apnea.  Baseline AHI was 21/39 in supine position on a home sleep test in 2023.    He is not on PAP therapy.     He wishes to restudy and restart PAP therapy.    He reports that he is confident that he could wear his PAP machine more than 4 hours per night.    He reports while being off of PAP therapy that his symptoms of excessive daytime sleepiness, snoring, morning headaches, reflux have all returned.  He reports he knows he was benefiting from PAP therapy.    Plan:    Check with insurance and see if he is required to  have a new baseline sleep study to restart PAP.    If restudy is required then do HST repeat.    We discussed if repeat HST is positive then we will reorder BiPAP then follow-up for a 31 to 90-day download.

## 2024-07-03 NOTE — ASSESSMENT & PLAN NOTE
Blood pressure today 120/86.    Noted that he is on blood pressure medication.  He is encouraged to continue to take his blood pressure medication, continue to follow-up with prescribing provider and he is reminded that untreated sleep apnea may potentiate his hypertension.    Plan to recheck HST and restart BiPAP.

## 2024-07-03 NOTE — ASSESSMENT & PLAN NOTE
Excessive daytime sleepiness, snoring, morning headaches, GERD, and sleep movement have all returned off PAP therapy.    [place] Sleepiness Scale is 11.    Plan:    Restudy and restart BiPAP    Follow-up symptoms at next visit

## 2024-07-03 NOTE — PROGRESS NOTES
"    Follow-Up Sleep Consult     Date:   2024  Name: Jacobo Kauffman  :   1990  PCP: Jennifer Alarcon MD    Chief Complaint   Patient presents with    Sleep Apnea       Subjective     History of Present Illness  Jacobo Kauffman is a 34 y.o. male who presents today for follow-up on GEORGE. He does not have a PAP device. He reports that the DME took his PAP back in May.     Now he has complaints of reoccurring EDS, snoring very loudly, morning headaches, increased in reflux  and he been moving more during sleep.  He wishes to restudy and he is convinced that he will use the PAP >4 hours per night if he can get his PAP back.     Neck Measurement is 18 inches     ESS: 11    Sleep history:    GEORGE baseline AHI 21/39 supine on HST 2023    Titration study 10/17/2023.  CPAP failed to correct GEORGE.  Titrated to BiPAP       Coexisting condition hypertension and obesity           The patient's relevant past medical, surgical, family, and social history reviewed and updated in Epic as appropriate.    Past Medical History:   Diagnosis Date    Acid reflux     Hypertension     GEORGE (obstructive sleep apnea)      History reviewed. No pertinent surgical history.    Allergies   Allergen Reactions    Lisinopril Cough     Prior to Admission medications    Medication Sig Start Date End Date Taking? Authorizing Provider   esomeprazole (nexIUM) 40 MG capsule  10/18/23  Yes Angelica Weinstein MD   losartan (COZAAR) 100 MG tablet Take 1 tablet by mouth Daily. 3/14/24  Yes Angelica Weinstein MD   losartan (COZAAR) 50 MG tablet Take 1 tablet by mouth 2 (Two) Times a Day.  7/3/24  Angelica Weinstein MD     Family History   Problem Relation Age of Onset    Heart disease Mother     Hypertension Mother     Diabetes Mother     Hypertension Father        Objective     Vital Signs:  /86   Pulse 70   Ht 172.7 cm (68\")   Wt 124 kg (274 lb)   SpO2 96%   BMI 41.66 kg/m²     Class 3 Severe Obesity (BMI >=40). " Obesity-related health conditions include the following: obstructive sleep apnea, hypertension, and GERD. Obesity is improving with lifestyle modifications. BMI is is above average; BMI management plan is completed. We discussed low calorie, low carb based diet program, portion control, and increasing exercise.        Physical Exam  Constitutional:       Appearance: He is obese.   HENT:      Head: Normocephalic.      Nose: Nose normal.      Mouth/Throat:      Mouth: Mucous membranes are moist.   Pulmonary:      Effort: Pulmonary effort is normal.   Musculoskeletal:      Cervical back: Neck supple.   Skin:     General: Skin is warm and dry.   Neurological:      Mental Status: He is alert and oriented to person, place, and time.   Psychiatric:         Mood and Affect: Mood normal.         Behavior: Behavior normal.         Thought Content: Thought content normal.                      Assessment and Plan     Diagnoses and all orders for this visit:    1. GEORGE (obstructive sleep apnea) (Primary)  Assessment & Plan:  Known history of moderate to severe sleep apnea.  Baseline AHI was 21/39 in supine position on a home sleep test in 2023.    He is not on PAP therapy.     He wishes to restudy and restart PAP therapy.    He reports that he is confident that he could wear his PAP machine more than 4 hours per night.    He reports while being off of PAP therapy that his symptoms of excessive daytime sleepiness, snoring, morning headaches, reflux have all returned.  He reports he knows he was benefiting from PAP therapy.    Plan:    Check with insurance and see if he is required to  have a new baseline sleep study to restart PAP.    If restudy is required then do HST repeat.    We discussed if repeat HST is positive then we will reorder BiPAP then follow-up for a 31 to 90-day download.          2. Hypersomnia, unspecified  Assessment & Plan:  Excessive daytime sleepiness, snoring, morning headaches, GERD, and sleep movement have  all returned off PAP therapy.    Berlin Sleepiness Scale is 11.    Plan:    Restudy and restart BiPAP    Follow-up symptoms at next visit    Orders:  -     Home Sleep Study; Future    3. Primary hypertension  Assessment & Plan:  Blood pressure today 120/86.    Noted that he is on blood pressure medication.  He is encouraged to continue to take his blood pressure medication, continue to follow-up with prescribing provider and he is reminded that untreated sleep apnea may potentiate his hypertension.    Plan to recheck HST and restart BiPAP.          Report if any new/changing symptoms immediately and Sleep risks reviewed (driving, medical, sleep death, sedating agents)         Follow Up  Return in about 3 months (around 10/3/2024) for GEORGE.  Patient was given instructions and counseling regarding his condition or for health maintenance advice. Please see specific information pulled into the AVS if appropriate.

## 2024-07-24 ENCOUNTER — TELEPHONE (OUTPATIENT)
Dept: CARDIOLOGY | Facility: CLINIC | Age: 34
End: 2024-07-24
Payer: COMMERCIAL

## 2024-07-24 DIAGNOSIS — G47.33 OSA (OBSTRUCTIVE SLEEP APNEA): Primary | ICD-10-CM

## 2024-07-24 DIAGNOSIS — G47.10 HYPERSOMNIA, UNSPECIFIED: ICD-10-CM

## 2024-07-24 NOTE — TELEPHONE ENCOUNTER
----- Message from Isa Menezes sent at 7/24/2024  3:52 PM EDT -----  Please call patient or patient's wife.  Let him know home sleep test is positive for moderate to severe sleep apnea.  I went on and placed his order for a new BiPAP with amp as we had discussed at his last office visit.  Please let them know if and has not called them in 1 to 2 weeks then please call our office back.  He has scheduled follow-up please keep this.

## 2024-07-25 NOTE — TELEPHONE ENCOUNTER
I spoke to patient's wife about the results, she verbally understood. She wants us to find out what number was sent over to [facility] for them to contact. It's easier to get ahold of his wife than him. Her number is [phone]. Can you make sure someone at [facility] has that number? Thank you!

## 2024-07-31 ENCOUNTER — TELEPHONE (OUTPATIENT)
Dept: CARDIOLOGY | Facility: CLINIC | Age: 34
End: 2024-07-31
Payer: COMMERCIAL

## 2024-07-31 NOTE — TELEPHONE ENCOUNTER
Aarti Menezes    Patient's wife called and states that BROCK Eric had ordered patient a BiPap and that after order was sent to [facility], the [employer] company called her and told her that patient was going to have to have an in-lab sleep study done before insurance would pay for his BiPap. She states patient has had one done at [facility] in the past. I advised her I would send a message to provider letting her know.

## 2024-07-31 NOTE — TELEPHONE ENCOUNTER
I have contacted [facility] rep for insurance rule clarification.  Awaiting phone call back.

## 2024-08-01 DIAGNOSIS — G47.10 HYPERSOMNIA, UNSPECIFIED: ICD-10-CM

## 2024-08-01 DIAGNOSIS — G47.33 OSA (OBSTRUCTIVE SLEEP APNEA): Primary | ICD-10-CM

## 2024-08-01 NOTE — TELEPHONE ENCOUNTER
Please call the patient's wife and let her know that insurance requires a new in lab titration study.  He did do the home sleep test and it was positive for sleep apnea.  This is the next step.  This is where he goes in the sleep lab and sleeps with the BiPAP on.  This is a requirement to replace his BiPAP.  I have placed an order for a BiPAP titration study at [facility].  Dank should call in about 2 weeks and get this scheduled.  Please have his wife call us if this is not scheduled in the next 2 weeks.

## 2024-08-01 NOTE — TELEPHONE ENCOUNTER
Called patient and let him know Dank would be contacting him about coming in for titration study and if they had not heard anything in 2 weeks to give us a call. Patient verbalized understanding

## 2024-09-10 DIAGNOSIS — G47.10 HYPERSOMNIA, UNSPECIFIED: ICD-10-CM

## 2024-09-10 DIAGNOSIS — G47.33 OSA (OBSTRUCTIVE SLEEP APNEA): ICD-10-CM

## 2024-09-24 ENCOUNTER — OFFICE VISIT (OUTPATIENT)
Dept: CARDIOLOGY | Facility: CLINIC | Age: 34
End: 2024-09-24
Payer: COMMERCIAL

## 2024-09-24 VITALS
BODY MASS INDEX: 43.19 KG/M2 | HEIGHT: 68 IN | HEART RATE: 80 BPM | OXYGEN SATURATION: 96 % | SYSTOLIC BLOOD PRESSURE: 132 MMHG | WEIGHT: 285 LBS | DIASTOLIC BLOOD PRESSURE: 84 MMHG

## 2024-09-24 DIAGNOSIS — G47.33 OSA (OBSTRUCTIVE SLEEP APNEA): Primary | ICD-10-CM

## 2024-09-24 DIAGNOSIS — G47.19 EXCESSIVE DAYTIME SLEEPINESS: ICD-10-CM

## 2024-09-24 PROBLEM — G47.10 HYPERSOMNIA, UNSPECIFIED: Status: RESOLVED | Noted: 2023-06-14 | Resolved: 2024-09-24

## 2024-09-24 PROCEDURE — 3079F DIAST BP 80-89 MM HG: CPT | Performed by: NURSE PRACTITIONER

## 2024-09-24 PROCEDURE — 3075F SYST BP GE 130 - 139MM HG: CPT | Performed by: NURSE PRACTITIONER

## 2024-09-24 PROCEDURE — 99213 OFFICE O/P EST LOW 20 MIN: CPT | Performed by: NURSE PRACTITIONER

## 2024-09-24 PROCEDURE — 1159F MED LIST DOCD IN RCRD: CPT | Performed by: NURSE PRACTITIONER

## 2024-09-24 PROCEDURE — 1160F RVW MEDS BY RX/DR IN RCRD: CPT | Performed by: NURSE PRACTITIONER

## 2024-09-24 RX ORDER — FAMOTIDINE 20 MG/1
TABLET, FILM COATED ORAL
COMMUNITY
Start: 2024-09-03 | End: 2024-09-24 | Stop reason: SDUPTHER

## 2024-09-24 RX ORDER — SUCRALFATE 1 G/1
TABLET ORAL
COMMUNITY
Start: 2024-09-03 | End: 2024-09-24 | Stop reason: SDUPTHER

## 2024-09-25 RX ORDER — SUCRALFATE 1 G/1
1 TABLET ORAL 4 TIMES DAILY
Qty: 120 TABLET | Refills: 0 | Status: SHIPPED | OUTPATIENT
Start: 2024-09-25

## 2024-09-25 RX ORDER — FAMOTIDINE 20 MG/1
20 TABLET, FILM COATED ORAL 2 TIMES DAILY
Qty: 60 TABLET | Refills: 0 | Status: SHIPPED | OUTPATIENT
Start: 2024-09-25

## 2024-11-25 ENCOUNTER — OFFICE VISIT (OUTPATIENT)
Dept: CARDIOLOGY | Facility: CLINIC | Age: 34
End: 2024-11-25
Payer: COMMERCIAL

## 2024-11-25 VITALS
BODY MASS INDEX: 42.59 KG/M2 | SYSTOLIC BLOOD PRESSURE: 130 MMHG | DIASTOLIC BLOOD PRESSURE: 78 MMHG | OXYGEN SATURATION: 96 % | HEART RATE: 80 BPM | HEIGHT: 68 IN | WEIGHT: 281 LBS

## 2024-11-25 DIAGNOSIS — G47.33 OSA (OBSTRUCTIVE SLEEP APNEA): Primary | ICD-10-CM

## 2024-11-25 PROCEDURE — 1160F RVW MEDS BY RX/DR IN RCRD: CPT | Performed by: NURSE PRACTITIONER

## 2024-11-25 PROCEDURE — 99213 OFFICE O/P EST LOW 20 MIN: CPT | Performed by: NURSE PRACTITIONER

## 2024-11-25 PROCEDURE — 3078F DIAST BP <80 MM HG: CPT | Performed by: NURSE PRACTITIONER

## 2024-11-25 PROCEDURE — 3075F SYST BP GE 130 - 139MM HG: CPT | Performed by: NURSE PRACTITIONER

## 2024-11-25 PROCEDURE — 1159F MED LIST DOCD IN RCRD: CPT | Performed by: NURSE PRACTITIONER

## 2024-11-25 NOTE — ASSESSMENT & PLAN NOTE
Known history of moderate to severe sleep apnea.  His baseline AHI is 25.  AHI increases to 44 in supine position.    He has titrated to BiPAP therapy.    He is on his new BiPAP more than 1 month but less than 3 months.    BiPAP download reviewed with good control and good compliance.    He is benefiting from BiPAP therapy.    We plan to continue BiPAP therapy.    He has a current prescription for his PAP supplies at the DME of his choice.    Follow-up for moderate to severe GEORGE on BiPAP in 9 months or sooner for any GEORGE or PAP concerns.

## 2024-11-25 NOTE — PROGRESS NOTES
Follow-Up Sleep Consult     Date:   2024  Name: Jacobo Kauffman  :   1990  PCP: Jennifer Alarcon MD    Chief Complaint   Patient presents with    Sleep Apnea       Subjective     History of Present Illness  Jacobo Kauffman is a 34 y.o. male who presents today for follow-up on GEORGE.  He has a known history of moderate to severe sleep apnea that has titrated to BiPAP.    Is on his new BiPAP more than 1 month but less than 3 months.    He reports that he is sleeping well.  He denies any snoring.  He denies any vivid dreams or sleep movements since starting his BiPAP therapy.    He reports that at times he will still pull BiPAP off during sleep but he reports about half the time he can sleep 6 to 7 hours with BiPAP on.    Sleep history and testing:    REPEAT HST 2024 baseline AHI 25/supine position AHI 44    Titration study 10/17/2023.  CPAP failed to correct GEORGE.  Titrated to BiPAP 16/12    Repeat titration 2024 and failed cpap and titrated to Bipap 14/10    Coexisting condition hypertension and obesity       Current mask used is fullface mask    Device Functioning Well: Yes  Mask Fit Comfortable: Yes  Air Flow Comfortable: Yes  DME Helpful for Supplies: Yes  Sleep is rested: Yes    Device Download:                     The patient's relevant past medical, surgical, family, and social history reviewed and updated in Epic as appropriate.    Past Medical History:   Diagnosis Date    Acid reflux     Hypertension     GEORGE (obstructive sleep apnea)      History reviewed. No pertinent surgical history.    Allergies   Allergen Reactions    Lisinopril Cough     Prior to Admission medications    Medication Sig Start Date End Date Taking? Authorizing Provider   esomeprazole (nexIUM) 40 MG capsule  10/18/23  Yes ProviderAngelica MD   famotidine (PEPCID) 20 MG tablet Take 1 tablet by mouth 2 (Two) Times a Day. 24  Yes Isa Menezes APRN   losartan (COZAAR) 100 MG tablet Take 1 tablet by  "mouth Daily. 3/14/24  Yes Provider, MD Angelica   sucralfate (CARAFATE) 1 g tablet Take 1 tablet by mouth 4 (Four) Times a Day. 9/25/24  Yes Isa Menezes APRN     Family History   Problem Relation Age of Onset    Heart disease Mother     Hypertension Mother     Diabetes Mother     Hypertension Father        Objective     Vital Signs:  /78 (BP Location: Right arm, Patient Position: Sitting, Cuff Size: Large Adult)   Pulse 80   Ht 172.7 cm (68\")   Wt 127 kg (281 lb)   SpO2 96%   BMI 42.73 kg/m²              Physical Exam  HENT:      Head: Normocephalic.      Nose: Nose normal.      Mouth/Throat:      Mouth: Mucous membranes are moist.   Pulmonary:      Effort: Pulmonary effort is normal.   Skin:     General: Skin is warm and dry.   Neurological:      Mental Status: He is alert and oriented to person, place, and time.   Psychiatric:         Mood and Affect: Mood normal.         Behavior: Behavior normal.         Thought Content: Thought content normal.         The following data was reviewed by: BROCK Odell on 11/25/2024:    PAP download reviewed: 30-day download as above.  I have reviewed and interpreted the data on the download at today's visit         Assessment and Plan     Diagnoses and all orders for this visit:    1. GEORGE (obstructive sleep apnea) (Primary)  Assessment & Plan:  Known history of moderate to severe sleep apnea.  His baseline AHI is 25.  AHI increases to 44 in supine position.    He has titrated to BiPAP therapy.    He is on his new BiPAP more than 1 month but less than 3 months.    BiPAP download reviewed with good control and good compliance.    He is benefiting from BiPAP therapy.    We plan to continue BiPAP therapy.    He has a current prescription for his PAP supplies at the DME of his choice.    Follow-up for moderate to severe GEORGE on BiPAP in 9 months or sooner for any GEORGE or PAP concerns.          Report if any new/changing symptoms immediately     "     Follow Up  Return in about 9 months (around 8/25/2025) for GEORGE.  Patient was given instructions and counseling regarding his condition or for health maintenance advice. Please see specific information pulled into the AVS if appropriate.

## 2025-08-25 ENCOUNTER — OFFICE VISIT (OUTPATIENT)
Dept: CARDIOLOGY | Facility: CLINIC | Age: 35
End: 2025-08-25
Payer: COMMERCIAL

## 2025-08-25 VITALS
OXYGEN SATURATION: 96 % | WEIGHT: 286 LBS | SYSTOLIC BLOOD PRESSURE: 122 MMHG | HEART RATE: 85 BPM | BODY MASS INDEX: 43.35 KG/M2 | HEIGHT: 68 IN | DIASTOLIC BLOOD PRESSURE: 72 MMHG

## 2025-08-25 DIAGNOSIS — G47.59 OTHER PARASOMNIA: ICD-10-CM

## 2025-08-25 DIAGNOSIS — G47.33 OSA (OBSTRUCTIVE SLEEP APNEA): Primary | ICD-10-CM

## 2025-08-25 PROCEDURE — 3078F DIAST BP <80 MM HG: CPT | Performed by: NURSE PRACTITIONER

## 2025-08-25 PROCEDURE — 1159F MED LIST DOCD IN RCRD: CPT | Performed by: NURSE PRACTITIONER

## 2025-08-25 PROCEDURE — 99213 OFFICE O/P EST LOW 20 MIN: CPT | Performed by: NURSE PRACTITIONER

## 2025-08-25 PROCEDURE — 3074F SYST BP LT 130 MM HG: CPT | Performed by: NURSE PRACTITIONER

## 2025-08-25 PROCEDURE — 1160F RVW MEDS BY RX/DR IN RCRD: CPT | Performed by: NURSE PRACTITIONER

## 2025-08-26 ENCOUNTER — PATIENT ROUNDING (BHMG ONLY) (OUTPATIENT)
Dept: CARDIOLOGY | Facility: CLINIC | Age: 35
End: 2025-08-26
Payer: COMMERCIAL